# Patient Record
Sex: FEMALE | Race: WHITE | NOT HISPANIC OR LATINO | Employment: FULL TIME | URBAN - METROPOLITAN AREA
[De-identification: names, ages, dates, MRNs, and addresses within clinical notes are randomized per-mention and may not be internally consistent; named-entity substitution may affect disease eponyms.]

---

## 2017-03-16 ENCOUNTER — OFFICE VISIT (OUTPATIENT)
Dept: MIDWIFE SERVICES | Facility: CLINIC | Age: 27
End: 2017-03-16
Payer: MEDICAID

## 2017-03-16 VITALS
DIASTOLIC BLOOD PRESSURE: 89 MMHG | SYSTOLIC BLOOD PRESSURE: 134 MMHG | BODY MASS INDEX: 23.46 KG/M2 | HEART RATE: 90 BPM | WEIGHT: 152 LBS

## 2017-03-16 DIAGNOSIS — Z11.3 SCREEN FOR STD (SEXUALLY TRANSMITTED DISEASE): ICD-10-CM

## 2017-03-16 DIAGNOSIS — B96.89 BV (BACTERIAL VAGINOSIS): ICD-10-CM

## 2017-03-16 DIAGNOSIS — Z01.419 ENCOUNTER FOR GYNECOLOGICAL EXAMINATION WITHOUT ABNORMAL FINDING: Primary | ICD-10-CM

## 2017-03-16 DIAGNOSIS — N89.8 VAGINAL DISCHARGE: ICD-10-CM

## 2017-03-16 DIAGNOSIS — N76.0 BV (BACTERIAL VAGINOSIS): ICD-10-CM

## 2017-03-16 DIAGNOSIS — F41.9 ANXIETY: ICD-10-CM

## 2017-03-16 LAB
MICRO REPORT STATUS: ABNORMAL
SPECIMEN SOURCE: ABNORMAL
WET PREP SPEC: ABNORMAL

## 2017-03-16 PROCEDURE — 99385 PREV VISIT NEW AGE 18-39: CPT | Performed by: ADVANCED PRACTICE MIDWIFE

## 2017-03-16 PROCEDURE — 87491 CHLMYD TRACH DNA AMP PROBE: CPT | Performed by: ADVANCED PRACTICE MIDWIFE

## 2017-03-16 PROCEDURE — 87591 N.GONORRHOEAE DNA AMP PROB: CPT | Performed by: ADVANCED PRACTICE MIDWIFE

## 2017-03-16 PROCEDURE — 87210 SMEAR WET MOUNT SALINE/INK: CPT | Performed by: ADVANCED PRACTICE MIDWIFE

## 2017-03-16 RX ORDER — METRONIDAZOLE 500 MG/1
500 TABLET ORAL 2 TIMES DAILY
Qty: 14 TABLET | Refills: 0 | Status: SHIPPED | OUTPATIENT
Start: 2017-03-16 | End: 2017-03-31

## 2017-03-16 NOTE — MR AVS SNAPSHOT
After Visit Summary   3/16/2017    Erin Mcpherson    MRN: [id number]           Patient Information     Date Of Birth          1990        Visit Information        Provider Department      3/16/2017 12:00 PM Crystal Garcia APRN CNM [facility]        Today's Diagnoses     Encounter for gynecological examination without abnormal finding    -  1    Screen for STD (sexually transmitted disease)        Vaginal discharge        Anxiety        BV (bacterial vaginosis)           Follow-ups after your visit        Additional Services     MENTAL HEALTH REFERRAL       Your provider has referred you to: [facility]: [facility] - Counseling (Individual/Couples/Family) - [facility] [phone]   [email or web address]   *Patient will be contacted by [facility] scheduling partner, [facility]), to schedule an appointment.  Patients may also call [facility] to schedule.    All scheduling is subject to the client's specific insurance plan & benefits, provider/location availability, and provider clinical specialities.  Please arrive 15 minutes early for your first appointment and bring your completed paperwork.    Please be aware that coverage of these services is subject to the terms and limitations of your health insurance plan.  Call member services at your health plan with any benefit or coverage questions.                  Your next 10 appointments already scheduled     Mar 30, 2017  9:30 AM CDT   PHYSICAL with Venessa Mims MD   [facility] ([facility])    [address] [place] [id number] [phone]              Who to contact     If you have questions or need follow up information about today's clinic visit or your schedule please contact [facility] directly at [phone].  Normal or non-critical lab  and imaging results will be communicated to you by [employer], letter or phone within 4 business days after the clinic has received the results. If you do not hear from us within 7 days, please contact the clinic through [employer] or phone. If you have a critical or abnormal lab result, we will notify you by phone as soon as possible.  Submit refill requests through [employer] or call your pharmacy and they will forward the refill request to us. Please allow 3 business days for your refill to be completed.          Additional Information About Your Visit        [employer] Information     [employer] gives you secure access to your electronic health record. If you see a primary care provider, you can also send messages to your care team and make appointments. If you have questions, please call your primary care clinic.  If you do not have a primary care provider, please call [phone] and they will assist you.        Care EveryWhere ID     This is your Care EveryWhere ID. This could be used by other organizations to access your [facility] medical records  [id number]        Your Vitals Were     Pulse Last Period Breastfeeding? BMI (Body Mass Index)          90 02/16/2017 No 23.46 kg/m2         Blood Pressure from Last 3 Encounters:   03/16/17 134/89   04/12/16 140/88   01/29/16 130/84    Weight from Last 3 Encounters:   03/16/17 152 lb (68.9 kg)   04/12/16 143 lb (64.9 kg)   01/29/16 145 lb 11.2 oz (66.1 kg)              We Performed the Following     Chlamydia trachomatis PCR     MENTAL HEALTH REFERRAL     Neisseria gonorrhoeae PCR     Wet prep          Today's Medication Changes          These changes are accurate as of: 3/16/17 11:59 PM.  If you have any questions, ask your nurse or doctor.               Start taking these medicines.        Dose/Directions    metroNIDAZOLE 500 MG tablet   Commonly known as:  FLAGYL   Used for:  BV (bacterial vaginosis)   Started by:  Crystal Garcia APRN CNM        Dose:  500 mg   Take 1  tablet (500 mg) by mouth 2 times daily   Quantity:  14 tablet   Refills:  0         Stop taking these medicines if you haven't already. Please contact your care team if you have questions.     vitamin B complex with vitamin C Tabs tablet   Stopped by:  Crystal Garcia APRN CNM                Where to get your medicines      These medications were sent to [id number] - [place], [place] - [address], [place] [address]     Phone:  [phone]     metroNIDAZOLE 500 MG tablet                Primary Care Provider    None       No address on file        Thank you!     Thank you for choosing [facility]  for your care. Our goal is always to provide you with excellent care. Hearing back from our patients is one way we can continue to improve our services. Please take a few minutes to complete the written survey that you may receive in the mail after your visit with us. Thank you!             Your Updated Medication List - Protect others around you: Learn how to safely use, store and throw away your medicines at www.disposemymeds.org.          This list is accurate as of: 3/16/17 11:59 PM.  Always use your most recent med list.                   Brand Name Dispense Instructions for use    metroNIDAZOLE 500 MG tablet    FLAGYL    14 tablet    Take 1 tablet (500 mg) by mouth 2 times daily       MIRENA (52 MG) 20 MCG/24HR IUD   Generic drug:  levonorgestrel      1 each by Intrauterine route once

## 2017-03-16 NOTE — NURSING NOTE
"Chief Complaint   Patient presents with     Physical       Initial /89  Pulse 90  Wt 152 lb (68.9 kg)  LMP 2017  Breastfeeding? No  BMI 23.46 kg/m2 Estimated body mass index is 23.46 kg/(m^2) as calculated from the following:    Height as of 2/17/15: 5' 7.5\" (1.715 m).    Weight as of this encounter: 152 lb (68.9 kg).  BP completed using cuff size: regular        The following HM Due: NONE      The following patient reported/Care Every where data was sent to:  P ABSTRACT QUALITY INITIATIVES [[id number]]  na     n/a             "
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
## 2017-03-17 LAB
C TRACH DNA SPEC QL NAA+PROBE: NORMAL
N GONORRHOEA DNA SPEC QL NAA+PROBE: NORMAL
SPECIMEN SOURCE: NORMAL
SPECIMEN SOURCE: NORMAL

## 2017-03-31 DIAGNOSIS — B96.89 BV (BACTERIAL VAGINOSIS): ICD-10-CM

## 2017-03-31 DIAGNOSIS — N76.0 BV (BACTERIAL VAGINOSIS): ICD-10-CM

## 2017-03-31 RX ORDER — METRONIDAZOLE 500 MG/1
500 TABLET ORAL 2 TIMES DAILY
Qty: 14 TABLET | Refills: 0 | Status: SHIPPED | OUTPATIENT
Start: 2017-03-31 | End: 2017-05-05

## 2017-03-31 NOTE — TELEPHONE ENCOUNTER
Rx sent by BUBBA. Per BUBBA:  I did refill but would not be surprised if now yeast.   So if pt continues to have discomfort with SI it is probably yeast as the Flagyl will kill good organisms along with the bad which is why we see so many in and out pt with BV then yeast then BV then yeast.       TC to patient. Aware script was sent. Discussed possible yeast. Pt will try Flagyl and if still has symptoms will call us back or try Monistat OTC 3 or 7 day.     Chelsi Amador RN-BSN

## 2017-03-31 NOTE — TELEPHONE ENCOUNTER
Patient calling to get refill on Metrondiazole. Pt was seen on 3/16. Still having clear, tacky, sticky discharge. SI yesterday was uncomfortable. Doesn't have any yeast symptoms. Can you send another prescription. Routing to on-call CNM.   Chelsi Amador, RN-BSN

## 2017-05-05 ENCOUNTER — OFFICE VISIT (OUTPATIENT)
Dept: FAMILY MEDICINE | Facility: CLINIC | Age: 27
End: 2017-05-05
Payer: COMMERCIAL

## 2017-05-05 VITALS
DIASTOLIC BLOOD PRESSURE: 87 MMHG | TEMPERATURE: 97.6 F | OXYGEN SATURATION: 100 % | RESPIRATION RATE: 14 BRPM | HEART RATE: 94 BPM | SYSTOLIC BLOOD PRESSURE: 130 MMHG | WEIGHT: 155 LBS | BODY MASS INDEX: 23.92 KG/M2

## 2017-05-05 DIAGNOSIS — B37.31 YEAST INFECTION OF THE VAGINA: Primary | ICD-10-CM

## 2017-05-05 LAB
ALBUMIN UR-MCNC: NEGATIVE MG/DL
APPEARANCE UR: CLEAR
BILIRUB UR QL STRIP: NEGATIVE
COLOR UR AUTO: YELLOW
GLUCOSE UR STRIP-MCNC: NEGATIVE MG/DL
HGB UR QL STRIP: NEGATIVE
KETONES UR STRIP-MCNC: NEGATIVE MG/DL
LEUKOCYTE ESTERASE UR QL STRIP: NEGATIVE
MICRO REPORT STATUS: NORMAL
NITRATE UR QL: NEGATIVE
PH UR STRIP: 7 PH (ref 5–7)
SP GR UR STRIP: <=1.005 (ref 1–1.03)
SPECIMEN SOURCE: NORMAL
URN SPEC COLLECT METH UR: NORMAL
UROBILINOGEN UR STRIP-ACNC: 0.2 EU/DL (ref 0.2–1)
WET PREP SPEC: NORMAL

## 2017-05-05 PROCEDURE — 87210 SMEAR WET MOUNT SALINE/INK: CPT | Performed by: PHYSICIAN ASSISTANT

## 2017-05-05 PROCEDURE — 81003 URINALYSIS AUTO W/O SCOPE: CPT | Performed by: PHYSICIAN ASSISTANT

## 2017-05-05 PROCEDURE — 99213 OFFICE O/P EST LOW 20 MIN: CPT | Performed by: PHYSICIAN ASSISTANT

## 2017-05-05 NOTE — MR AVS SNAPSHOT
After Visit Summary   5/5/2017    Erin Mcpherson    MRN: [id number]           Patient Information     Date Of Birth          1990        Visit Information        Provider Department      5/5/2017 9:00 AM Berenice Sky PA-C [facility]        Today's Diagnoses     Yeast infection of the vagina    -  1       Follow-ups after your visit        Who to contact     If you have questions or need follow up information about today's clinic visit or your schedule please contact [facility] directly at [phone].  Normal or non-critical lab and imaging results will be communicated to you by [employer], letter or phone within 4 business days after the clinic has received the results. If you do not hear from us within 7 days, please contact the clinic through [employer] or phone. If you have a critical or abnormal lab result, we will notify you by phone as soon as possible.  Submit refill requests through [employer] or call your pharmacy and they will forward the refill request to us. Please allow 3 business days for your refill to be completed.          Additional Information About Your Visit        MyChart Information     [employer] gives you secure access to your electronic health record. If you see a primary care provider, you can also send messages to your care team and make appointments. If you have questions, please call your primary care clinic.  If you do not have a primary care provider, please call [phone] and they will assist you.        Care EveryWhere ID     This is your Care EveryWhere ID. This could be used by other organizations to access your [place] medical records  [id number]        Your Vitals Were     Pulse Temperature Respirations Pulse Oximetry BMI (Body Mass Index)       94 97.6  F (36.4  C) (Oral) 14 100% 23.92 kg/m2        Blood Pressure from Last 3 Encounters:   05/05/17 130/87   03/16/17 134/89   04/12/16 140/88    Weight from Last 3 Encounters:    05/05/17 155 lb (70.3 kg)   03/16/17 152 lb (68.9 kg)   04/12/16 143 lb (64.9 kg)              We Performed the Following     *UA reflex to Microscopic and Culture ([place] and [facility] (except [place] and [place])     Wet prep        Primary Care Provider    None       No address on file        Thank you!     Thank you for choosing [facility]  for your care. Our goal is always to provide you with excellent care. Hearing back from our patients is one way we can continue to improve our services. Please take a few minutes to complete the written survey that you may receive in the mail after your visit with us. Thank you!             Your Updated Medication List - Protect others around you: Learn how to safely use, store and throw away your medicines at www.disposemymeds.org.          This list is accurate as of: 5/5/17 11:44 AM.  Always use your most recent med list.                   Brand Name Dispense Instructions for use    MIRENA (52 MG) 20 MCG/24HR IUD   Generic drug:  levonorgestrel      1 each by Intrauterine route once

## 2017-05-05 NOTE — NURSING NOTE
"Chief Complaint   Patient presents with     Vaginal Problem       Initial /87 (BP Location: Left arm)  Pulse 94  Temp 97.6  F (36.4  C) (Oral)  Resp 14  Wt 155 lb (70.3 kg)  SpO2 100%  BMI 23.92 kg/m2 Estimated body mass index is 23.92 kg/(m^2) as calculated from the following:    Height as of 2/17/15: 5' 7.5\" (1.715 m).    Weight as of this encounter: 155 lb (70.3 kg).  Medication Reconciliation: complete     Gwendolyn Sweeney CMA  ]    "
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
## 2017-06-02 ENCOUNTER — OFFICE VISIT (OUTPATIENT)
Dept: FAMILY MEDICINE | Facility: CLINIC | Age: 27
End: 2017-06-02
Payer: COMMERCIAL

## 2017-06-02 VITALS
HEIGHT: 68 IN | OXYGEN SATURATION: 100 % | HEART RATE: 90 BPM | WEIGHT: 155.2 LBS | SYSTOLIC BLOOD PRESSURE: 122 MMHG | BODY MASS INDEX: 23.52 KG/M2 | TEMPERATURE: 98.3 F | DIASTOLIC BLOOD PRESSURE: 70 MMHG

## 2017-06-02 DIAGNOSIS — F43.23 ADJUSTMENT DISORDER WITH MIXED ANXIETY AND DEPRESSED MOOD: ICD-10-CM

## 2017-06-02 DIAGNOSIS — E16.2 HYPOGLYCEMIA: Primary | ICD-10-CM

## 2017-06-02 PROCEDURE — 99214 OFFICE O/P EST MOD 30 MIN: CPT | Performed by: PHYSICIAN ASSISTANT
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
## 2017-06-02 NOTE — MR AVS SNAPSHOT
After Visit Summary   6/2/2017    Erin Mcpherson    MRN: [id number]           Patient Information     Date Of Birth          1990        Visit Information        Provider Department      6/2/2017 9:20 AM Berenice Sky PA-C [facility]        Care Instructions      Hypoglycemic Reaction (Nondiabetic)  You have had an episode of low blood sugar (hypoglycemia). A single episode of hypoglycemia does not mean that you are diabetic or that this problem will recur. There are many causes for low blood sugar. These include eating highly refined starchy foods (carbohydrates), drinking too much alcohol, intense exercise, fatigue, stress, poor diet, pregnancy, and certain illnesses.  Your blood sugar level may also be affected by tobacco, caffeine, and certain medicines, including:    Aspirin    Haloperidol    Propoxyphene    Chlorpromazine    Propranolol    Disopyramide    ACE inhibitors  A class of medicine called beta-blockers is used for high blood pressure, rapid heart rates, and other conditions. Beta-blockers may prevent the early symptoms of low blood sugar. If you are taking a beta-blocker, you might not realize that your blood sugar is getting low. If you are taking a beta-blocker and are prone to low blood sugar, talk to your healthcare provider about switching to a different class of medicine. The beta-blocker class includes:    Propranolol    Atenolol    Metoprolol    Nadolol    Labetalol    Carvedilol  Home care    Rest today and resume a normal diet. Eliminate any of the above known causes where possible.    The proper diet for true hypoglycemia (diagnosed with a glucose tolerance test) is high protein (20% of calories), low carbohydrate (50% of calories), and moderate fat (30% of calories) in 6 small meals per day.    If this is your first episode of low blood sugar, or if you have not yet been tested with a glucose tolerance test, eat small frequent meals rather than  fewer large meals. Limit starchy foods during the next 1 to 2 days to avoid a recurrence of low blood sugar.    It is important to learn the warning signals your body gives as your blood sugar starts to drop. See the symptoms listed below.  If symptoms of hypoglycemia return    Keep a source of fast-acting sugar with you. At the first sign of low blood sugar, eat or drink 15 to 20 grams of fast-acting sugar. Examples include:    3 to 4 glucose tablets (found at most drugstores)    4 ounces of regular soda    4 ounces of fruit juice    2 tablespoons of raisins    1 tablespoon of honey    If consuming fast-acting sugar does not improve your symptoms within 20 minutes, go to an emergency room.    If you have severe hypoglycemic spells, wear a medical alert bracelet or carry a card in your wallet describing this condition. If you have a severe hypoglycemic reaction and are unable to give this information, it will help medical staff provide proper care.  Follow-up care  Follow up with your healthcare provider, or as advised.  When to seek medical advice  Call your healthcare provider right away if any of these symptoms of low blood sugar occur.    Fatigue or headache    Trembling or excess sweating    Hunger    Feeling anxious or restless    Vision changes    Irritability    Sleepiness    Dizziness  Call 911  Contact emergency services right away if any of these occur:    Drowsiness    Weakness    Confusion    Loss of consciousness    6334-3479 [employer]. [address] [place] [address], [place] [address]. All rights reserved. This information is not intended as a substitute for professional medical care. Always follow your healthcare professional's instructions.                Follow-ups after your visit        Who to contact     If you have questions or need follow up information about today's clinic visit or your schedule please contact [facility] directly at [phone].  Normal or  "non-critical lab and imaging results will be communicated to you by MyChart, letter or phone within 4 business days after the clinic has received the results. If you do not hear from us within 7 days, please contact the clinic through [employer] or phone. If you have a critical or abnormal lab result, we will notify you by phone as soon as possible.  Submit refill requests through [employer] or call your pharmacy and they will forward the refill request to us. Please allow 3 business days for your refill to be completed.          Additional Information About Your Visit        [employer] Information     [employer] gives you secure access to your electronic health record. If you see a primary care provider, you can also send messages to your care team and make appointments. If you have questions, please call your primary care clinic.  If you do not have a primary care provider, please call [phone] and they will assist you.        Care EveryWhere ID     This is your Care EveryWhere ID. This could be used by other organizations to access your [place] medical records  [id number]        Your Vitals Were     Pulse Temperature Height Pulse Oximetry BMI (Body Mass Index)       90 98.3  F (36.8  C) (Oral) 5' 7.5\" (1.715 m) 100% 23.95 kg/m2        Blood Pressure from Last 3 Encounters:   06/02/17 122/70   05/05/17 130/87   03/16/17 134/89    Weight from Last 3 Encounters:   06/02/17 155 lb 3.2 oz (70.4 kg)   05/05/17 155 lb (70.3 kg)   03/16/17 152 lb (68.9 kg)              Today, you had the following     No orders found for display       Primary Care Provider    None       No address on file        Thank you!     Thank you for choosing [facility]  for your care. Our goal is always to provide you with excellent care. Hearing back from our patients is one way we can continue to improve our services. Please take a few minutes to complete the written survey that you may receive in the mail after your visit with us. " Thank you!             Your Updated Medication List - Protect others around you: Learn how to safely use, store and throw away your medicines at www.disposemymeds.org.          This list is accurate as of: 6/2/17  9:58 AM.  Always use your most recent med list.                   Brand Name Dispense Instructions for use    MIRENA (52 MG) 20 MCG/24HR IUD   Generic drug:  levonorgestrel      1 each by Intrauterine route once

## 2017-06-02 NOTE — PATIENT INSTRUCTIONS
Hypoglycemic Reaction (Nondiabetic)  You have had an episode of low blood sugar (hypoglycemia). A single episode of hypoglycemia does not mean that you are diabetic or that this problem will recur. There are many causes for low blood sugar. These include eating highly refined starchy foods (carbohydrates), drinking too much alcohol, intense exercise, fatigue, stress, poor diet, pregnancy, and certain illnesses.  Your blood sugar level may also be affected by tobacco, caffeine, and certain medicines, including:    Aspirin    Haloperidol    Propoxyphene    Chlorpromazine    Propranolol    Disopyramide    ACE inhibitors  A class of medicine called beta-blockers is used for high blood pressure, rapid heart rates, and other conditions. Beta-blockers may prevent the early symptoms of low blood sugar. If you are taking a beta-blocker, you might not realize that your blood sugar is getting low. If you are taking a beta-blocker and are prone to low blood sugar, talk to your healthcare provider about switching to a different class of medicine. The beta-blocker class includes:    Propranolol    Atenolol    Metoprolol    Nadolol    Labetalol    Carvedilol  Home care    Rest today and resume a normal diet. Eliminate any of the above known causes where possible.    The proper diet for true hypoglycemia (diagnosed with a glucose tolerance test) is high protein (20% of calories), low carbohydrate (50% of calories), and moderate fat (30% of calories) in 6 small meals per day.    If this is your first episode of low blood sugar, or if you have not yet been tested with a glucose tolerance test, eat small frequent meals rather than fewer large meals. Limit starchy foods during the next 1 to 2 days to avoid a recurrence of low blood sugar.    It is important to learn the warning signals your body gives as your blood sugar starts to drop. See the symptoms listed below.  If symptoms of hypoglycemia return    Keep a source of fast-acting  sugar with you. At the first sign of low blood sugar, eat or drink 15 to 20 grams of fast-acting sugar. Examples include:    3 to 4 glucose tablets (found at most drugstores)    4 ounces of regular soda    4 ounces of fruit juice    2 tablespoons of raisins    1 tablespoon of honey    If consuming fast-acting sugar does not improve your symptoms within 20 minutes, go to an emergency room.    If you have severe hypoglycemic spells, wear a medical alert bracelet or carry a card in your wallet describing this condition. If you have a severe hypoglycemic reaction and are unable to give this information, it will help medical staff provide proper care.  Follow-up care  Follow up with your healthcare provider, or as advised.  When to seek medical advice  Call your healthcare provider right away if any of these symptoms of low blood sugar occur.    Fatigue or headache    Trembling or excess sweating    Hunger    Feeling anxious or restless    Vision changes    Irritability    Sleepiness    Dizziness  Call 911  Contact emergency services right away if any of these occur:    Drowsiness    Weakness    Confusion    Loss of consciousness    [id number] [employer]. [address] [place] [address], [place] [address]. All rights reserved. This information is not intended as a substitute for professional medical care. Always follow your healthcare professional's instructions.

## 2017-07-05 ENCOUNTER — OFFICE VISIT (OUTPATIENT)
Dept: FAMILY MEDICINE | Facility: CLINIC | Age: 27
End: 2017-07-05
Payer: COMMERCIAL

## 2017-07-05 VITALS
DIASTOLIC BLOOD PRESSURE: 84 MMHG | HEART RATE: 80 BPM | OXYGEN SATURATION: 100 % | WEIGHT: 158 LBS | TEMPERATURE: 98.9 F | BODY MASS INDEX: 24.38 KG/M2 | SYSTOLIC BLOOD PRESSURE: 122 MMHG

## 2017-07-05 DIAGNOSIS — N89.8 VAGINAL ITCHING: Primary | ICD-10-CM

## 2017-07-05 DIAGNOSIS — N94.10 DYSPAREUNIA, FEMALE: ICD-10-CM

## 2017-07-05 LAB
ALBUMIN UR-MCNC: NEGATIVE MG/DL
APPEARANCE UR: CLEAR
BACTERIA #/AREA URNS HPF: ABNORMAL /HPF
BILIRUB UR QL STRIP: NEGATIVE
COLOR UR AUTO: YELLOW
GLUCOSE UR STRIP-MCNC: NEGATIVE MG/DL
HGB UR QL STRIP: NEGATIVE
KETONES UR STRIP-MCNC: NEGATIVE MG/DL
LEUKOCYTE ESTERASE UR QL STRIP: ABNORMAL
MICRO REPORT STATUS: NORMAL
NITRATE UR QL: NEGATIVE
NON-SQ EPI CELLS #/AREA URNS LPF: ABNORMAL /LPF
PH UR STRIP: 7 PH (ref 5–7)
RBC #/AREA URNS AUTO: ABNORMAL /HPF (ref 0–2)
SP GR UR STRIP: 1.01 (ref 1–1.03)
SPECIMEN SOURCE: NORMAL
URN SPEC COLLECT METH UR: ABNORMAL
UROBILINOGEN UR STRIP-ACNC: 0.2 EU/DL (ref 0.2–1)
WBC #/AREA URNS AUTO: ABNORMAL /HPF (ref 0–2)
WET PREP SPEC: NORMAL

## 2017-07-05 PROCEDURE — 99213 OFFICE O/P EST LOW 20 MIN: CPT | Performed by: FAMILY MEDICINE

## 2017-07-05 PROCEDURE — 87210 SMEAR WET MOUNT SALINE/INK: CPT | Performed by: FAMILY MEDICINE

## 2017-07-05 PROCEDURE — 81001 URINALYSIS AUTO W/SCOPE: CPT | Performed by: FAMILY MEDICINE
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
## 2017-07-05 NOTE — NURSING NOTE
Pt decline PHQ9 and SULLY questionnaire. Pt state she never do the questionnaire here because she have a specialist for anxiety.   Staff override reminder for these questions.    Sameera Chen MA

## 2017-07-05 NOTE — NURSING NOTE
"Chief Complaint   Patient presents with     Vaginal Problem     infection       Initial /84 (BP Location: Left arm, Patient Position: Chair, Cuff Size: Adult Regular)  Pulse 80  Temp 98.9  F (37.2  C) (Oral)  Wt 158 lb (71.7 kg)  SpO2 100%  BMI 24.38 kg/m2 Estimated body mass index is 24.38 kg/(m^2) as calculated from the following:    Height as of 6/2/17: 5' 7.5\" (1.715 m).    Weight as of this encounter: 158 lb (71.7 kg).  Medication Reconciliation: complete   Sameera Chen MA        "

## 2017-07-05 NOTE — MR AVS SNAPSHOT
After Visit Summary   7/5/2017    Erin Mcpherson    MRN: [id number]           Patient Information     Date Of Birth          1990        Visit Information        Provider Department      7/5/2017 8:20 AM Dee Castellanos MD [facility]        Today's Diagnoses     Vaginal itching    -  1    Dyspareunia, female          Care Instructions    Prelim wet prep and urine test negative   follow up Primary for routine care  See Gyn in 2018 as planned forI UD removal and replacement, labs , pap etc or earier as needed   Here is a list of suggestions that may help treat vaginal infections and may help maintain a healthy vaginal environment.    Many of these suggestions are for;    1. boosting your immune system so you can heal faster  2. changing the vaginal environment to a more acid state    3.  increasing the good healthy bacteria    Read through them and try the ones that seem to fit you and your life style.    Soak in a warm bath tub, no soap, no bubble bath and no oils.  Add one cup vinegar or lemon juice to bath water once in a while,     Keep a water bottle with a squirt top in your bathroom, fill with warm water and use as a spray after wiping, then pat dry.  May add 1-3 TBS vinegar to help maintain an acidic environment.    Wear cotton underwear; loose pants or skirt, no pantyhose.  No thong underwear.    Do not wear underwear to bed.  The vaginal environment needs to breathe.    Keep vaginal area dry, you can even use a hairdryer on cool setting after shower or bath    To boost your immune system increase daily intake of;  1. Rest  2. Fluids (2-3 quarts per day),    3. Foods such as nuts, grains, raw vegetables, yogurt, graeme, grapefruit, unsweetened cranberries and juices (8 oz daily)  4. Vitamin intake (if not pregnant)              Vitamin B complex 100mg              Calcium 1000mg              Magnesium 500mg              Vitamin C 2-4 grams              Vitamin E 1,000  IU              Vitamin A 50,000 IU    Decrease daily intake of refined sugars, honey, red meat and alcohol    At each meal drink 1tsp apple cider vinegar and 1 tsp honey in   cup warm water    Acidophilus 4-5 TBS in one quart of water 3-4 times daily or as tablets 2-3 tabs 3-4 times a day    Apply plain yogurt externally to vaginal area, chamomile or chickweed cream - applied externally for relief of itching (may be found commercially).    Echinacea - 3 times a day for chronic problem or every 2 hours for acute symptoms    Take garlic daily, capsules or fresh.      Boric acid, insert 2 capsules  00  daily into vagina for seven days (found at most [employer] stores or co-ops)    If your symptoms do not resolve or if you have questions please call the clinic.              Follow-ups after your visit        Who to contact     If you have questions or need follow up information about today's clinic visit or your schedule please contact [facility] directly at [phone].  Normal or non-critical lab and imaging results will be communicated to you by [employer], letter or phone within 4 business days after the clinic has received the results. If you do not hear from us within 7 days, please contact the clinic through [employer] or phone. If you have a critical or abnormal lab result, we will notify you by phone as soon as possible.  Submit refill requests through [employer] or call your pharmacy and they will forward the refill request to us. Please allow 3 business days for your refill to be completed.          Additional Information About Your Visit        [employer] Information     [employer] gives you secure access to your electronic health record. If you see a primary care provider, you can also send messages to your care team and make appointments. If you have questions, please call your primary care clinic.  If you do not have a primary care provider, please call [phone] and they will assist you.         Care EveryWhere ID     This is your Care EveryWhere ID. This could be used by other organizations to access your [facility] medical records  [id number]        Your Vitals Were     Pulse Temperature Pulse Oximetry BMI (Body Mass Index)          80 98.9  F (37.2  C) (Oral) 100% 24.38 kg/m2         Blood Pressure from Last 3 Encounters:   07/05/17 122/84   06/02/17 122/70   05/05/17 130/87    Weight from Last 3 Encounters:   07/05/17 158 lb (71.7 kg)   06/02/17 155 lb 3.2 oz (70.4 kg)   05/05/17 155 lb (70.3 kg)              We Performed the Following     UA reflex to Microscopic and Culture     Urine Microscopic     Wet prep        Primary Care Provider    None       No address on file        Equal Access to Services     CLEMENTE SHIN : Navid Fairbanks, waaxda luqadaha, qaybta kaalmada enrique, david estes . So [facility] [phone].    ATENCIÓN: Si habla español, tiene a villareal disposición servicios gratuitos de asistencia lingüística. Llame al [phone].    We comply with applicable federal civil rights laws and [place] laws. We do not discriminate on the basis of race, color, national origin, age, disability sex, sexual orientation or gender identity.            Thank you!     Thank you for choosing [facility]  for your care. Our goal is always to provide you with excellent care. Hearing back from our patients is one way we can continue to improve our services. Please take a few minutes to complete the written survey that you may receive in the mail after your visit with us. Thank you!             Your Updated Medication List - Protect others around you: Learn how to safely use, store and throw away your medicines at www.disposemymeds.org.          This list is accurate as of: 7/5/17  9:15 AM.  Always use your most recent med list.                   Brand Name Dispense Instructions for use Diagnosis    MIRENA (52 MG) 20 MCG/24HR IUD   Generic drug:   levonorgestrel      1 each by Intrauterine route once

## 2017-07-05 NOTE — PATIENT INSTRUCTIONS
Prelim wet prep and urine test negative   follow up Primary for routine care  See Gyn in 2018 as planned forI UD removal and replacement, labs , pap etc or earier as needed   Here is a list of suggestions that may help treat vaginal infections and may help maintain a healthy vaginal environment.    Many of these suggestions are for;    1. boosting your immune system so you can heal faster  2. changing the vaginal environment to a more acid state    3.  increasing the good healthy bacteria    Read through them and try the ones that seem to fit you and your life style.    Soak in a warm bath tub, no soap, no bubble bath and no oils.  Add one cup vinegar or lemon juice to bath water once in a while,     Keep a water bottle with a squirt top in your bathroom, fill with warm water and use as a spray after wiping, then pat dry.  May add 1-3 TBS vinegar to help maintain an acidic environment.    Wear cotton underwear; loose pants or skirt, no pantyhose.  No thong underwear.    Do not wear underwear to bed.  The vaginal environment needs to breathe.    Keep vaginal area dry, you can even use a hairdryer on cool setting after shower or bath    To boost your immune system increase daily intake of;  1. Rest  2. Fluids (2-3 quarts per day),    3. Foods such as nuts, grains, raw vegetables, yogurt, graeme, grapefruit, unsweetened cranberries and juices (8 oz daily)  4. Vitamin intake (if not pregnant)              Vitamin B complex 100mg              Calcium 1000mg              Magnesium 500mg              Vitamin C 2-4 grams              Vitamin E 1,000 IU              Vitamin A 50,000 IU    Decrease daily intake of refined sugars, honey, red meat and alcohol    At each meal drink 1tsp apple cider vinegar and 1 tsp honey in   cup warm water    Acidophilus 4-5 TBS in one quart of water 3-4 times daily or as tablets 2-3 tabs 3-4 times a day    Apply plain yogurt externally to vaginal area, chamomile or chickweed cream - applied  externally for relief of itching (may be found commercially).    Echinacea - 3 times a day for chronic problem or every 2 hours for acute symptoms    Take garlic daily, capsules or fresh.      Boric acid, insert 2 capsules  00  daily into vagina for seven days (found at most health food stores or co-ops)    If your symptoms do not resolve or if you have questions please call the clinic.

## 2017-09-11 ENCOUNTER — OFFICE VISIT (OUTPATIENT)
Dept: FAMILY MEDICINE | Facility: CLINIC | Age: 27
End: 2017-09-11
Payer: COMMERCIAL

## 2017-09-11 VITALS
DIASTOLIC BLOOD PRESSURE: 88 MMHG | HEART RATE: 77 BPM | TEMPERATURE: 97.6 F | WEIGHT: 167.1 LBS | BODY MASS INDEX: 25.79 KG/M2 | SYSTOLIC BLOOD PRESSURE: 133 MMHG | OXYGEN SATURATION: 100 %

## 2017-09-11 DIAGNOSIS — N89.8 VAGINAL DISCHARGE: Primary | ICD-10-CM

## 2017-09-11 DIAGNOSIS — E16.2 HYPOGLYCEMIA: ICD-10-CM

## 2017-09-11 LAB
SPECIMEN SOURCE: NORMAL
WET PREP SPEC: NORMAL

## 2017-09-11 PROCEDURE — 99214 OFFICE O/P EST MOD 30 MIN: CPT | Performed by: PHYSICIAN ASSISTANT

## 2017-09-11 PROCEDURE — 87210 SMEAR WET MOUNT SALINE/INK: CPT | Performed by: PHYSICIAN ASSISTANT

## 2017-09-11 ASSESSMENT — ENCOUNTER SYMPTOMS
DIZZINESS: 1
VOMITING: 0
DIARRHEA: 0
SHORTNESS OF BREATH: 0
BLURRED VISION: 1
CHILLS: 0
FOCAL WEAKNESS: 0
WEAKNESS: 1
NAUSEA: 0
ABDOMINAL PAIN: 0
HEADACHES: 0
FEVER: 0
DYSURIA: 0
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
## 2017-09-11 NOTE — MR AVS SNAPSHOT
After Visit Summary   9/11/2017    Erin Mcpherson    MRN: [id number]           Patient Information     Date Of Birth          1990        Visit Information        Provider Department      9/11/2017 8:50 AM Jocelyn Isbell PA-C [facility]        Today's Diagnoses     Vaginal discharge    -  1    Hypoglycemia          Care Instructions      Follow up in 1-2 weeks.      Hypoglycemic Reaction (Nondiabetic)  You have had an episode of low blood sugar (hypoglycemia). A single episode of hypoglycemia does not mean that you have diabetes or that this problem will recur. There are many causes for low blood sugar. These include eating highly refined starchy foods (carbohydrates), drinking too much alcohol, intense exercise, fatigue, stress, poor diet, pregnancy, and certain illnesses.  Your blood sugar level may also be affected by tobacco, caffeine, and certain medicines, including:    Aspirin    Haloperidol    Propoxyphene    Chlorpromazine    Propranolol    Disopyramide    ACE inhibitors  A class of medicine called beta-blockers is used for high blood pressure, rapid heart rates, and other conditions. Beta-blockers may prevent the early symptoms of low blood sugar. If you are taking a beta-blocker, you might not realize that your blood sugar is getting low. If you are taking a beta-blocker and are prone to low blood sugar, talk to your healthcare provider about switching to a different class of medicine. The beta-blocker class includes:    Propranolol    Atenolol    Metoprolol    Nadolol    Labetalol    Carvedilol  Home care    Rest today and resume a normal diet. Eliminate any of the above known causes where possible.    The proper diet for true hypoglycemia (diagnosed with a glucose tolerance test) is high protein (20% of calories), low carbohydrate (50% of calories), and moderate fat (30% of calories) in 6 small meals per day.    If this is your first episode of low blood sugar,  or if you have not yet been tested with a glucose tolerance test, eat small frequent meals rather than fewer large meals. Limit starchy foods during the next 1 to 2 days to avoid a recurrence of low blood sugar.    It is important to learn the warning signals your body gives as your blood sugar starts to drop. See the symptoms listed below.  If symptoms of hypoglycemia return    Keep a source of fast-acting sugar with you. At the first sign of low blood sugar, eat or drink 15 to 20 grams of fast-acting sugar. Examples include:    3 to 4 glucose tablets (found at most drugstores)    4 ounces of regular soda    4 ounces of fruit juice    2 tablespoons of raisins    1 tablespoon of honey    If consuming fast-acting sugar does not improve your symptoms within 20 minutes, go to an emergency room.    If you have severe hypoglycemic spells, wear a medical alert bracelet or carry a card in your wallet describing this condition. If you have a severe hypoglycemic reaction and are unable to give this information, it will help medical staff provide proper care.  Follow-up care  Follow up with your healthcare provider, or as advised.  When to seek medical advice  Call your healthcare provider right away if any of these symptoms of low blood sugar occur.    Fatigue or headache    Trembling or excess sweating    Hunger    Feeling anxious or restless    Vision changes    Irritability    Sleepiness    Dizziness  Call 911  Contact emergency services right away if any of these occur:    Drowsiness    Weakness    Confusion    Loss of consciousness  Date Last Reviewed: 8/24/2015 2000-2017 [employer]. [address] [place] [address], [place] [address]. All rights reserved. This information is not intended as a substitute for professional medical care. Always follow your healthcare professional's instructions.                Follow-ups after your visit        Your next 10 appointments already scheduled     Sep 14, 2017  3:00 PM  CDT   Office Visit with KASSIE Mack CNM   [facility] ([facility])    [address] [place] [id number] [phone]           Bring a current list of meds and any records pertaining to this visit. For Physicals, please bring immunization records and any forms needing to be filled out. Please arrive 10 minutes early to complete paperwork.              Who to contact     If you have questions or need follow up information about today's clinic visit or your schedule please contact [facility] directly at [phone].  Normal or non-critical lab and imaging results will be communicated to you by [employer], letter or phone within 4 business days after the clinic has received the results. If you do not hear from us within 7 days, please contact the clinic through [phone] or phone. If you have a critical or abnormal lab result, we will notify you by phone as soon as possible.  Submit refill requests through [employer] or call your pharmacy and they will forward the refill request to us. Please allow 3 business days for your refill to be completed.          Additional Information About Your Visit        [employer] Information     [employer] gives you secure access to your electronic health record. If you see a primary care provider, you can also send messages to your care team and make appointments. If you have questions, please call your primary care clinic.  If you do not have a primary care provider, please call [phone] and they will assist you.        Care EveryWhere ID     This is your Care EveryWhere ID. This could be used by other organizations to access your [facility] medical records  [id number]        Your Vitals Were     Pulse Temperature Pulse Oximetry BMI (Body Mass Index)          77 97.6  F (36.4  C) (Oral) 100% 25.79 kg/m2         Blood Pressure from Last 3 Encounters:   09/11/17 133/88   07/05/17 122/84   06/02/17 122/70     Weight from Last 3 Encounters:   09/11/17 167 lb 1.6 oz (75.8 kg)   07/05/17 158 lb (71.7 kg)   06/02/17 155 lb 3.2 oz (70.4 kg)              We Performed the Following     Beta hydroxybutyrate level     C-peptide     Glucose, whole blood     Insulin level     Proinsulin     Sulfonylurea Screen     Wet prep        Primary Care Provider    None       No address on file        Equal Access to Services     [facility] CenterBA[facility]ISTA : Hadii aad ku hadasho Soomaali, waaxda luqadaha, qaybta kaalmada adefredyyada, waxluma serranoin haymauron ivan mcelroybrittneelashaun estes . So [facility] [phone].    ATENCIÓN: Si habla español, tiene a villareal disposición servicios gratuitos de asistencia lingüística. Llame al [phone].    We comply with applicable federal civil rights laws and [place] laws. We do not discriminate on the basis of race, color, national origin, age, disability sex, sexual orientation or gender identity.            Thank you!     Thank you for choosing [facility]  for your care. Our goal is always to provide you with excellent care. Hearing back from our patients is one way we can continue to improve our services. Please take a few minutes to complete the written survey that you may receive in the mail after your visit with us. Thank you!             Your Updated Medication List - Protect others around you: Learn how to safely use, store and throw away your medicines at www.disposemymeds.org.          This list is accurate as of: 9/11/17  9:33 AM.  Always use your most recent med list.                   Brand Name Dispense Instructions for use Diagnosis    MIRENA (52 MG) 20 MCG/24HR IUD   Generic drug:  levonorgestrel      1 each by Intrauterine route once

## 2017-09-11 NOTE — PATIENT INSTRUCTIONS
Follow up in 1-2 weeks.      Hypoglycemic Reaction (Nondiabetic)  You have had an episode of low blood sugar (hypoglycemia). A single episode of hypoglycemia does not mean that you have diabetes or that this problem will recur. There are many causes for low blood sugar. These include eating highly refined starchy foods (carbohydrates), drinking too much alcohol, intense exercise, fatigue, stress, poor diet, pregnancy, and certain illnesses.  Your blood sugar level may also be affected by tobacco, caffeine, and certain medicines, including:    Aspirin    Haloperidol    Propoxyphene    Chlorpromazine    Propranolol    Disopyramide    ACE inhibitors  A class of medicine called beta-blockers is used for high blood pressure, rapid heart rates, and other conditions. Beta-blockers may prevent the early symptoms of low blood sugar. If you are taking a beta-blocker, you might not realize that your blood sugar is getting low. If you are taking a beta-blocker and are prone to low blood sugar, talk to your healthcare provider about switching to a different class of medicine. The beta-blocker class includes:    Propranolol    Atenolol    Metoprolol    Nadolol    Labetalol    Carvedilol  Home care    Rest today and resume a normal diet. Eliminate any of the above known causes where possible.    The proper diet for true hypoglycemia (diagnosed with a glucose tolerance test) is high protein (20% of calories), low carbohydrate (50% of calories), and moderate fat (30% of calories) in 6 small meals per day.    If this is your first episode of low blood sugar, or if you have not yet been tested with a glucose tolerance test, eat small frequent meals rather than fewer large meals. Limit starchy foods during the next 1 to 2 days to avoid a recurrence of low blood sugar.    It is important to learn the warning signals your body gives as your blood sugar starts to drop. See the symptoms listed below.  If symptoms of hypoglycemia  return    Keep a source of fast-acting sugar with you. At the first sign of low blood sugar, eat or drink 15 to 20 grams of fast-acting sugar. Examples include:    3 to 4 glucose tablets (found at most drugstores)    4 ounces of regular soda    4 ounces of fruit juice    2 tablespoons of raisins    1 tablespoon of honey    If consuming fast-acting sugar does not improve your symptoms within 20 minutes, go to an emergency room.    If you have severe hypoglycemic spells, wear a medical alert bracelet or carry a card in your wallet describing this condition. If you have a severe hypoglycemic reaction and are unable to give this information, it will help medical staff provide proper care.  Follow-up care  Follow up with your healthcare provider, or as advised.  When to seek medical advice  Call your healthcare provider right away if any of these symptoms of low blood sugar occur.    Fatigue or headache    Trembling or excess sweating    Hunger    Feeling anxious or restless    Vision changes    Irritability    Sleepiness    Dizziness  Call 911  Contact emergency services right away if any of these occur:    Drowsiness    Weakness    Confusion    Loss of consciousness  Date Last Reviewed: 8/24/2015 2000-2017 [employer]. [address] [place] [address], [place] [address]. All rights reserved. This information is not intended as a substitute for professional medical care. Always follow your healthcare professional's instructions.
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
## 2017-09-14 ENCOUNTER — OFFICE VISIT (OUTPATIENT)
Dept: MIDWIFE SERVICES | Facility: CLINIC | Age: 27
End: 2017-09-14
Payer: COMMERCIAL

## 2017-09-14 VITALS
OXYGEN SATURATION: 100 % | TEMPERATURE: 97 F | BODY MASS INDEX: 25.77 KG/M2 | HEART RATE: 99 BPM | WEIGHT: 167 LBS | SYSTOLIC BLOOD PRESSURE: 135 MMHG | DIASTOLIC BLOOD PRESSURE: 92 MMHG

## 2017-09-14 DIAGNOSIS — E16.2 HYPOGLYCEMIA: ICD-10-CM

## 2017-09-14 DIAGNOSIS — Z30.431 IUD CHECK UP: Primary | ICD-10-CM

## 2017-09-14 PROCEDURE — 99212 OFFICE O/P EST SF 10 MIN: CPT | Performed by: ADVANCED PRACTICE MIDWIFE
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
## 2017-09-14 NOTE — MR AVS SNAPSHOT
After Visit Summary   9/14/2017    Erin Mcpherson    MRN: [id number]           Patient Information     Date Of Birth          1990        Visit Information        Provider Department      9/14/2017 3:00 PM Crystal Garcia APRN CNM [facility]        Today's Diagnoses     IUD check up    -  1    Hypoglycemia           Follow-ups after your visit        Future tests that were ordered for you today     Open Future Orders        Priority Expected Expires Ordered    Glucose Routine 9/15/2017 9/14/2018 9/14/2017    Insulin level Routine 9/15/2017 9/14/2018 9/14/2017    C-peptide Routine 9/15/2017 9/14/2018 9/14/2017    Beta hydroxybutyrate level Routine 9/15/2017 9/14/2018 9/14/2017    Proinsulin Routine 9/15/2017 9/14/2018 9/14/2017    Sulfonylurea Screen Routine 9/15/2017 9/14/2018 9/14/2017            Who to contact     If you have questions or need follow up information about today's clinic visit or your schedule please contact [facility] directly at [phone].  Normal or non-critical lab and imaging results will be communicated to you by [employer], letter or phone within 4 business days after the clinic has received the results. If you do not hear from us within 7 days, please contact the clinic through [employer] or phone. If you have a critical or abnormal lab result, we will notify you by phone as soon as possible.  Submit refill requests through [employer] or call your pharmacy and they will forward the refill request to us. Please allow 3 business days for your refill to be completed.          Additional Information About Your Visit        MyChart Information     [employer] gives you secure access to your electronic health record. If you see a primary care provider, you can also send messages to your care team and make appointments. If you have questions, please call your primary care clinic.  If you do not have a primary care provider, please call [phone] and  they will assist you.        Care EveryWhere ID     This is your Care EveryWhere ID. This could be used by other organizations to access your [place] medical records  [id number]        Your Vitals Were     Pulse Temperature Pulse Oximetry BMI (Body Mass Index)          99 97  F (36.1  C) (Oral) 100% 25.77 kg/m2         Blood Pressure from Last 3 Encounters:   09/14/17 (!) 135/92   09/11/17 133/88   07/05/17 122/84    Weight from Last 3 Encounters:   09/14/17 167 lb (75.8 kg)   09/11/17 167 lb 1.6 oz (75.8 kg)   07/05/17 158 lb (71.7 kg)               Primary Care Provider    None       No address on file        Equal Access to Services     CLEMENTE SHIN : Navid winterso Magdi, waaxda luqadaha, qaybta kaalmada adefredyyayang, david fountain. So [facility] [phone].    ATENCIÓN: Si habla español, tiene a villareal disposición servicios gratuitos de asistencia lingüística. Llame al [phone].    We comply with applicable federal civil rights laws and [place] laws. We do not discriminate on the basis of race, color, national origin, age, disability sex, sexual orientation or gender identity.            Thank you!     Thank you for choosing [facility]  for your care. Our goal is always to provide you with excellent care. Hearing back from our patients is one way we can continue to improve our services. Please take a few minutes to complete the written survey that you may receive in the mail after your visit with us. Thank you!             Your Updated Medication List - Protect others around you: Learn how to safely use, store and throw away your medicines at www.disposemymeds.org.          This list is accurate as of: 9/14/17  6:07 PM.  Always use your most recent med list.                   Brand Name Dispense Instructions for use Diagnosis    MIRENA (52 MG) 20 MCG/24HR IUD   Generic drug:  levonorgestrel      1 each by Intrauterine route once

## 2018-04-21 ENCOUNTER — HEALTH MAINTENANCE LETTER (OUTPATIENT)
Age: 28
End: 2018-04-21

## 2019-12-15 ENCOUNTER — HEALTH MAINTENANCE LETTER (OUTPATIENT)
Age: 29
End: 2019-12-15

## 2020-03-22 ENCOUNTER — HEALTH MAINTENANCE LETTER (OUTPATIENT)
Age: 30
End: 2020-03-22

## 2023-02-17 ENCOUNTER — TELEPHONE (OUTPATIENT)
Dept: PSYCHOLOGY | Facility: CLINIC | Age: 33
End: 2023-02-17
Payer: COMMERCIAL
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
